# Patient Record
Sex: MALE | URBAN - METROPOLITAN AREA
[De-identification: names, ages, dates, MRNs, and addresses within clinical notes are randomized per-mention and may not be internally consistent; named-entity substitution may affect disease eponyms.]

---

## 2022-01-28 ENCOUNTER — OFFICE VISIT (OUTPATIENT)
Dept: URBAN - METROPOLITAN AREA CLINIC 90 | Facility: CLINIC | Age: 47
End: 2022-01-28
Payer: COMMERCIAL

## 2022-01-28 DIAGNOSIS — H52.13 MYOPIA, BILATERAL: ICD-10-CM

## 2022-01-28 DIAGNOSIS — H10.45 OTHER CHRONIC ALLERGIC CONJUNCTIVITIS: ICD-10-CM

## 2022-01-28 DIAGNOSIS — G43.909 MIGRAINE: ICD-10-CM

## 2022-01-28 DIAGNOSIS — H35.363 DRUSEN (DEGENERATIVE) OF MACULA, BILATERAL: Primary | ICD-10-CM

## 2022-01-28 DIAGNOSIS — H04.123 DRY EYE SYNDROME OF BILATERAL LACRIMAL GLANDS: ICD-10-CM

## 2022-01-28 PROCEDURE — 92015 DETERMINE REFRACTIVE STATE: CPT | Performed by: SPECIALIST

## 2022-01-28 PROCEDURE — 99214 OFFICE O/P EST MOD 30 MIN: CPT | Performed by: SPECIALIST

## 2022-01-28 PROCEDURE — 92134 CPTRZ OPH DX IMG PST SGM RTA: CPT | Performed by: SPECIALIST

## 2022-01-28 RX ORDER — BEPOTASTINE BESILATE 15 MG/ML
1.5 % SOLUTION/ DROPS OPHTHALMIC
Qty: 5 | Refills: 2 | Status: ACTIVE
Start: 2022-01-28

## 2022-01-28 ASSESSMENT — VISUAL ACUITY
OS: 20/20
OD: 20/20

## 2022-01-28 ASSESSMENT — INTRAOCULAR PRESSURE
OD: 13
OS: 15

## 2022-01-28 NOTE — IMPRESSION/PLAN
Impression: Other chronic allergic conjunctivitis: H10.45. Plan: Due to allergic conjunctivitis instructed patient to start Bepreve BID OU Allergic Conjunctivitis -- The condition was  discussed with the patient. Avoidance of allergens and cool compresses were recommended.

## 2022-01-28 NOTE — IMPRESSION/PLAN
Impression: Migraine: G43.909.  Plan: Discussed and ocular migraine, vision is stable and no treatment is required as of now

## 2022-01-28 NOTE — IMPRESSION/PLAN
Impression: Drusen (degenerative) of macula, bilateral: H35.363. Plan: OCTm reviewed and discussed stable Drusen OU For drusen I have explained the AREDS vitamin study, and advised patient that it would be beneficial for them to take. I stressed the importance of compliance and regular follow-up appointments.

## 2023-08-28 ENCOUNTER — OFFICE VISIT (OUTPATIENT)
Dept: URBAN - METROPOLITAN AREA CLINIC 91 | Facility: CLINIC | Age: 48
End: 2023-08-28
Payer: COMMERCIAL

## 2023-08-28 DIAGNOSIS — H52.13 MYOPIA, BILATERAL: ICD-10-CM

## 2023-08-28 DIAGNOSIS — H04.123 DRY EYE SYNDROME OF BILATERAL LACRIMAL GLANDS: Primary | ICD-10-CM

## 2023-08-28 PROCEDURE — 99212 OFFICE O/P EST SF 10 MIN: CPT | Performed by: SPECIALIST

## 2023-08-28 PROCEDURE — 92015 DETERMINE REFRACTIVE STATE: CPT | Performed by: SPECIALIST

## 2023-08-28 ASSESSMENT — INTRAOCULAR PRESSURE
OS: 15
OD: 14

## 2023-08-28 ASSESSMENT — VISUAL ACUITY
OS: 20/20
OD: 20/20

## 2025-07-28 ENCOUNTER — OFFICE VISIT (OUTPATIENT)
Facility: LOCATION | Age: 50
End: 2025-07-28
Payer: COMMERCIAL

## 2025-07-28 DIAGNOSIS — H04.123 DRY EYE SYNDROME OF BILATERAL LACRIMAL GLANDS: Primary | ICD-10-CM

## 2025-07-28 DIAGNOSIS — H52.13 MYOPIA, BILATERAL: ICD-10-CM

## 2025-07-28 DIAGNOSIS — H52.4 PRESBYOPIA: ICD-10-CM

## 2025-07-28 PROCEDURE — 99214 OFFICE O/P EST MOD 30 MIN: CPT | Performed by: SPECIALIST

## 2025-07-28 PROCEDURE — 92015 DETERMINE REFRACTIVE STATE: CPT | Performed by: SPECIALIST

## 2025-07-28 ASSESSMENT — INTRAOCULAR PRESSURE
OS: 16
OD: 16